# Patient Record
Sex: MALE | Race: BLACK OR AFRICAN AMERICAN
[De-identification: names, ages, dates, MRNs, and addresses within clinical notes are randomized per-mention and may not be internally consistent; named-entity substitution may affect disease eponyms.]

---

## 2020-11-02 ENCOUNTER — HOSPITAL ENCOUNTER (EMERGENCY)
Dept: HOSPITAL 63 - ER | Age: 25
Discharge: HOME | End: 2020-11-02
Payer: COMMERCIAL

## 2020-11-02 VITALS — WEIGHT: 178.57 LBS | BODY MASS INDEX: 25.56 KG/M2 | HEIGHT: 70 IN

## 2020-11-02 VITALS — SYSTOLIC BLOOD PRESSURE: 127 MMHG | DIASTOLIC BLOOD PRESSURE: 73 MMHG

## 2020-11-02 DIAGNOSIS — Z91.018: ICD-10-CM

## 2020-11-02 DIAGNOSIS — N13.2: Primary | ICD-10-CM

## 2020-11-02 DIAGNOSIS — R11.2: ICD-10-CM

## 2020-11-02 LAB
ALBUMIN SERPL-MCNC: 4.2 G/DL (ref 3.4–5)
ALP SERPL-CCNC: 84 U/L (ref 46–116)
ALT SERPL-CCNC: 37 U/L (ref 16–63)
AMPHETAMINE/METHAMPHETAMINE: (no result)
ANION GAP SERPL CALC-SCNC: 10 MMOL/L (ref 6–14)
APTT PPP: YELLOW S
AST SERPL-CCNC: 17 U/L (ref 15–37)
BACTERIA #/AREA URNS HPF: 0 /HPF
BARBITURATES UR-MCNC: (no result) UG/ML
BASOPHILS # BLD AUTO: 0 X10^3/UL (ref 0–0.2)
BASOPHILS NFR BLD: 0 % (ref 0–3)
BENZODIAZ UR-MCNC: (no result) UG/L
BILIRUB DIRECT SERPL-MCNC: 0.1 MG/DL (ref 0–0.2)
BILIRUB SERPL-MCNC: 0.2 MG/DL (ref 0.2–1)
BILIRUB UR QL STRIP: (no result)
CA-I SERPL ISE-MCNC: 17 MG/DL (ref 8–26)
CALCIUM SERPL-MCNC: 9.3 MG/DL (ref 8.5–10.1)
CANNABINOIDS UR-MCNC: (no result) UG/L
CHLORIDE SERPL-SCNC: 103 MMOL/L (ref 98–107)
CO2 SERPL-SCNC: 28 MMOL/L (ref 21–32)
COCAINE UR-MCNC: (no result) NG/ML
CREAT SERPL-MCNC: 1.3 MG/DL (ref 0.7–1.3)
EOSINOPHIL NFR BLD: 0.1 X10^3/UL (ref 0–0.7)
EOSINOPHIL NFR BLD: 1 % (ref 0–3)
ERYTHROCYTE [DISTWIDTH] IN BLOOD BY AUTOMATED COUNT: 13.4 % (ref 11.5–14.5)
FIBRINOGEN PPP-MCNC: CLEAR MG/DL
GFR SERPLBLD BASED ON 1.73 SQ M-ARVRAT: 80.7 ML/MIN
GLUCOSE SERPL-MCNC: 127 MG/DL (ref 70–99)
GLUCOSE UR STRIP-MCNC: (no result) MG/DL
HCT VFR BLD CALC: 43.8 % (ref 39–53)
HGB BLD-MCNC: 14.3 G/DL (ref 13–17.5)
LIPASE: 178 U/L (ref 73–393)
LYMPHOCYTES # BLD: 1.8 X10^3/UL (ref 1–4.8)
LYMPHOCYTES NFR BLD AUTO: 17 % (ref 24–48)
MCH RBC QN AUTO: 30 PG (ref 25–35)
MCHC RBC AUTO-ENTMCNC: 33 G/DL (ref 31–37)
MCV RBC AUTO: 90 FL (ref 79–100)
METHADONE SERPL-MCNC: (no result) NG/ML
MONO #: 0.6 X10^3/UL (ref 0–1.1)
MONOCYTES NFR BLD: 6 % (ref 0–9)
NEUT #: 7.9 X10^3UL (ref 1.8–7.7)
NEUTROPHILS NFR BLD AUTO: 76 % (ref 31–73)
NITRITE UR QL STRIP: (no result)
OPIATES UR-MCNC: (no result) NG/ML
PCP SERPL-MCNC: (no result) MG/DL
PLATELET # BLD AUTO: 160 X10^3/UL (ref 140–400)
POTASSIUM SERPL-SCNC: 3.3 MMOL/L (ref 3.5–5.1)
PROT SERPL-MCNC: 7.6 G/DL (ref 6.4–8.2)
RBC # BLD AUTO: 4.85 X10^6/UL (ref 4.3–5.7)
RBC #/AREA URNS HPF: (no result) /HPF (ref 0–2)
SODIUM SERPL-SCNC: 141 MMOL/L (ref 136–145)
SP GR UR STRIP: 1.02
SQUAMOUS #/AREA URNS LPF: (no result) /LPF
UROBILINOGEN UR-MCNC: 0.2 MG/DL
WBC # BLD AUTO: 10.4 X10^3/UL (ref 4–11)
WBC #/AREA URNS HPF: (no result) /HPF (ref 0–4)

## 2020-11-02 PROCEDURE — 80048 BASIC METABOLIC PNL TOTAL CA: CPT

## 2020-11-02 PROCEDURE — 85025 COMPLETE CBC W/AUTO DIFF WBC: CPT

## 2020-11-02 PROCEDURE — 83690 ASSAY OF LIPASE: CPT

## 2020-11-02 PROCEDURE — 85610 PROTHROMBIN TIME: CPT

## 2020-11-02 PROCEDURE — 36415 COLL VENOUS BLD VENIPUNCTURE: CPT

## 2020-11-02 PROCEDURE — 85730 THROMBOPLASTIN TIME PARTIAL: CPT

## 2020-11-02 PROCEDURE — 96361 HYDRATE IV INFUSION ADD-ON: CPT

## 2020-11-02 PROCEDURE — 82550 ASSAY OF CK (CPK): CPT

## 2020-11-02 PROCEDURE — 96374 THER/PROPH/DIAG INJ IV PUSH: CPT

## 2020-11-02 PROCEDURE — 96375 TX/PRO/DX INJ NEW DRUG ADDON: CPT

## 2020-11-02 PROCEDURE — 74176 CT ABD & PELVIS W/O CONTRAST: CPT

## 2020-11-02 PROCEDURE — 96372 THER/PROPH/DIAG INJ SC/IM: CPT

## 2020-11-02 PROCEDURE — 99285 EMERGENCY DEPT VISIT HI MDM: CPT

## 2020-11-02 PROCEDURE — 81001 URINALYSIS AUTO W/SCOPE: CPT

## 2020-11-02 PROCEDURE — 80307 DRUG TEST PRSMV CHEM ANLYZR: CPT

## 2020-11-02 PROCEDURE — 74022 RADEX COMPL AQT ABD SERIES: CPT

## 2020-11-02 PROCEDURE — 84484 ASSAY OF TROPONIN QUANT: CPT

## 2020-11-02 PROCEDURE — 80076 HEPATIC FUNCTION PANEL: CPT

## 2020-11-02 PROCEDURE — 93005 ELECTROCARDIOGRAM TRACING: CPT

## 2020-11-02 NOTE — PHYS DOC
General Adult


EDM:


Chief Complaint:  ABDOMINAL PAIN





HPI:


HPI:


"... I am dying... I ate some frozen chicken for dinner... Maybe it is bad..... 

I vomited up some brown stuff may be blood... I hurts so bad... Here in my left 

back and abdomen with pain runs clear down into my left nut... I cannot even 

stand still... This is come on all of a sudden.. I hurt so bad.. I am puking..."








Patient is a 25 year old male who presents with above hx and acute onset of 

vomiting, nausea, Lt flank pain that radiates to Lt testicle.   Pain started 

after eating some frozen chicken for dinner.  Pain is localized into left 

abdomen and flank.  Pain seems to radiate to left lower abdomen and testicle 

area.  Patient denies any past history of kidney stones or kidney stones with 

family members.  Patient denies any past history of colitis with him or family 

members.   Patient denies any trauma..   Patient denies any fever or chills.  

Patient denies any recent travel outside the [place] area.  Patient denies 

any ill contacts.  Patient denies any trauma.  Patient has been able to have 

normal stools and pass urine.  Patient has never had this symptom before.  

Patient states pain is so severe he cannot measure it.  Patient states the pain 

is so severe it caused him to vomit.   Patient has not had any tarry stools.  

Patient is unable to find a comfortable position and is constantly pacing the 

exam room 3.  Patient did state he smokes marijuana before the eating the frozen

chicken.   Patient denies other drug use.  Patient denies any history 

immunosuppression.  Patient denies any history of dysuria.





Review of Systems:


Review of Systems:


Constitutional:  Denies fever or chills 


Eyes:  Denies change in visual acuity 


HENT:  Denies nasal congestion or sore throat 


Respiratory:  Denies cough or shortness of breath 


Cardiovascular:  Denies chest pain or edema 


GI: Complains of acute onset left flank abdominal pain, nausea, vomiting.  

Denies, bloody stools or diarrhea 


: Denies dysuria 


Musculoskeletal: Complains of left flank pain


Integument:  Denies rash 


Neurologic:  Denies headache, focal weakness or sensory changes 


Endocrine:  Denies polyuria or polydipsia 


Lymphatic:  Denies swollen glands 


Psychiatric: Very anxious





Family History:


Family History:


Noncontributory to presentation





Current Medications:


Current Meds:


See nursing for home meds





Allergies:


Allergies:


Patient states he is allergic to all tree nuts





Physical Exam:


PE:





Constitutional: Well developed, well nourished, in acute distress, non-toxic 

appearance. []


HENT: Normocephalic, atraumatic, bilateral external ears normal, oropharynx 

moist, no oral exudates, nose normal. []


Eyes: PERRLA, EOMI, conjunctiva normal, no discharge. [] 


Neck: Normal range of motion, no tenderness, supple, no stridor. [] 


Cardiovascular: Tachycardia heart rate regular rhythm, no murmur []


Lungs & Thorax:  Bilateral breath sounds equal at apex on auscultation []


Abdomen: Bowel sounds decreased, soft, left flank pain and left lower abdomen 

pain, no masses, no pulsatile masses.  No penile discharge.  Testicles are non 

tender.  Rebound pain to left flank and left lower quadrant


Skin: Warm, diaphoretic, no erythema, no rash. [] 


Back: No tenderness, left CVA tenderness. [] 


Extremities: No tenderness, no cyanosis, no clubbing, ROM intact, no edema.  No 

psoas sign.


Neurologic: Alert and oriented X 3, normal motor function, normal sensory 

function, no focal deficits noted. []


Psychologic: Affect extremely anxious, agitated, unable to sit down paces the 

room, judgement normal, mood normal. []





EKG:


EKG:


My interpretation EKG shows a sinus rhythm at 69 bpm.  Does have some right axis

 changes however essentially a normal EKG.  No findings of acute morphology.  []





Radiology/Procedures:


Radiology/Procedures:


[][facility] [address], [place] [address]


                                 [phone]


                                        


                                 IMAGING REPORT





                                     Signed





PATIENT: ROWENA GAR ACCOUNT: [id number]     MRN#: [id number]


: 1995           LOCATION: ER              AGE: 25


SEX: M                    EXAM DT: 20         ACCESSION#: [id number]


STATUS: REG ER            ORD. PHYSICIAN: TISHA VERMA MD


REASON: Flank pain, vomiting, radiates to Lt. testicle


PROCEDURE: CT ABDOMEN PELVIS WO CONTRAST





PROCEDURE: CT ABDOMEN PELVIS WO CONTRAST, ACUTE ABDOMEN SERIES


 


STUDY DATE: 2020


 


CLINICAL INDICATION / HISTORY: Reason: Flank pain, vomiting, radiates to 


Lt. testicle / Spl. Instructions:  / History: .


 


TECHNIQUE: Upright PA chest, supine and upright films of the abdomen were 


obtained.


 


COMPARISON: None


 


FINDINGS: AP view the chest reveals the lungs to be clear.   Cardiac and 


mediastinal silhouette are unremarkable.  No free air is identified below 


the diaphragms.  Supine and decubitus views of the abdomen reveal no 


dilated loops of bowel or air-fluid levels.   No organomegaly is present. 


No destructive osseous lesions.


 


IMPRESSION: No radiographic evidence for bowel obstruction.


 


 


 


 


EXAM: CT Abdomen and Pelvis without IV contrast


 


INDICATION: Reason: Flank pain, vomiting, radiates to Lt. testicle / Spl. 


Instructions:  / History: 


 


TECHNIQUE: Multi-detector row CT images were acquired from the lung bases 


through the abdomen and pelvis without the use of IV contrast. Sagittal 


and coronal images were acquired from the transaxial data. All CT scans 


performed at this facility utilize dose optimization techniques as 


appropriate to the exam, including the following: Automated exposure 


control and adjustment of the mA and/or KV according to patient size (this


includes techniques or standardized protocols for targeted exams where 


dose is indication/reason for exam).


 


ORAL CONTRAST: None


 


COMPARISON: Obstructive series same day


 


FINDINGS: 


 


The absence of IV contrast limits evaluation of soft tissue pathology.


 


LOWER CHEST: Unremarkable


 


LIVER:  Unremarkable


 


BILIARY SYSTEM: Gallbladder is unremarkable. Bile ducts are not dilated.


 


PANCREAS:  Unremarkable


 


SPLEEN:  Unremarkable


 


ADRENALS:  Unremarkable


 


KIDNEYS & URETERS:  Left hydronephrosis and hydroureter is present in 


the setting of a 5 mm distal left ureteral stone (image 22 of series 2).


 


BLADDER:  Unremarkable


 


REPRODUCTIVE ORGANS:  Unremarkable


 


GASTROINTESTINAL: The stomach, small bowel, and colon are unremarkable. 


The appendix is normal.


 


MESENTERY/PERITONEUM/RETROPERITONEUM: Unremarkable


 


VASCULAR:  Unremarkable


 


LYMPH NODES:  No adenopathy


 


OSSEOUS & SOFT TISSUES:  Unremarkable


 


IMPRESSION:


 


Left ureteral obstruction from a 5 mm stone distally.


 


Electronically signed by: Reshma Javed MD (2020 7:43 PM) 


[facility] [place]














DICTATED AND SIGNED BY:     RESHMA JAVED MD


DATE:     20





CC: TISHA VERMA MD; PCP,NO ~





Heart Score:


HEART Score for Chest Pain:  








HEART Score for Chest Pain Response (Comments) Value


 


History Slighlty/Non-Suspicious 0


 


ECG Normal 0


 


Age < 45 0


 


Risk Factors No Risk Factors 0


 


Troponin < Normal Limit 0


 


Total  0








Risk Factors:


Risk Factors:  DM, Current or recent (<one month) smoker, HTN, HLP, family 

history of CAD, obesity.


Risk Scores:


Score 0 - 3:  2.5% MACE over next 6 weeks - Discharge Home


Score 4 - 6:  20.3% MACE over next 6 weeks - Admit for Clinical Observation


Score 7 - 10:  72.7% MACE over next 6 weeks - Early Invasive Strategies





Course & Med Decision Making:


Course & Med Decision Making


Pertinent Labs and Imaging studies reviewed. (See chart for details)





Patient to continue to push fluids.  Remain on a clear fluid diet.  No solids or

 milk products.  Patient told to expect another episode of severe pain.  Patient

 has a distal 5 mm stone with hydronephrosis.  Advised patient most likely he 

will be able to pass the stone since it is travel this far.. Advised patient he 

may take Zofran for active vomiting.  Patient take Tylenol and ibuprofen for 

pain.  For marked pain may take Vicoprofen.  Must continue to push fluids.  If 

stone is passed save it for further evaluation.  Advised patient if unable to to

lerate next episode of pain to present where they have urology consult such as 

[facility]  or with a urologist of his choice.  Patient to 

follow-up with urine for any signs of infection.  Patient discussed his recent 

episode of kidney stone with his primary care.  Patient advised he should never 

allow himself to become dehydrated because this will increase kidney stone 

formation.  








Impression:





1.  Abdomen pain-renal colic


2.  History of nausea and vomiting


3.  Hx tobacco marijuana use





[]





Dragon Disclaimer:


Dragon Disclaimer:


This electronic medical record was generated, in whole or in part, using a voice

 recognition dictation system.





Departure


Departure:


Referrals:  


PCP,NO (PCP)


Scripts


Ondansetron Hcl (ZOFRAN) 4 Mg Tablet


8 MG PO QIDPRN PRN for active n/v, #30 TAB


   Prov: TISHA VERMA MD         20 


Hydrocodone/Ibuprofen (HYDROCODONE-IBUPROFEN 7.5-200  **) 1 Each Tablet


1 TAB PO PRN Q6HRS PRN for PAIN, #30 TAB 0 Refills


   Prov: TISHA VERMA MD         20





Dragon Disclaimer


This chart was dictated in whole or in part using Voice Recognition software in 

a busy, high-work load, and often noisy Emergency Department environment.  It 

may contain unintended and wholly unrecognized errors or omissions.











TISHA VERMA MD            2020 18:23

## 2020-11-02 NOTE — RAD
PROCEDURE: CT ABDOMEN PELVIS WO CONTRAST, ACUTE ABDOMEN SERIES

 

STUDY DATE: 11/2/2020

 

CLINICAL INDICATION / HISTORY: Reason: Flank pain, vomiting, radiates to 

Lt. testicle / Spl. Instructions:  / History: .

 

TECHNIQUE: Upright PA chest, supine and upright films of the abdomen were 

obtained.

 

COMPARISON: None

 

FINDINGS: AP view the chest reveals the lungs to be clear.   Cardiac and 

mediastinal silhouette are unremarkable.  No free air is identified below 

the diaphragms.  Supine and decubitus views of the abdomen reveal no 

dilated loops of bowel or air-fluid levels.   No organomegaly is present. 

No destructive osseous lesions.

 

IMPRESSION: No radiographic evidence for bowel obstruction.

 

 

 

 

EXAM: CT Abdomen and Pelvis without IV contrast

 

INDICATION: Reason: Flank pain, vomiting, radiates to Lt. testicle / Spl. 

Instructions:  / History: 

 

TECHNIQUE: Multi-detector row CT images were acquired from the lung bases 

through the abdomen and pelvis without the use of IV contrast. Sagittal 

and coronal images were acquired from the transaxial data. All CT scans 

performed at this facility utilize dose optimization techniques as 

appropriate to the exam, including the following: Automated exposure 

control and adjustment of the mA and/or KV according to patient size (this

includes techniques or standardized protocols for targeted exams where 

dose is indication/reason for exam).

 

ORAL CONTRAST: None

 

COMPARISON: Obstructive series same day

 

FINDINGS: 

 

The absence of IV contrast limits evaluation of soft tissue pathology.

 

LOWER CHEST: Unremarkable

 

LIVER:  Unremarkable

 

BILIARY SYSTEM: Gallbladder is unremarkable. Bile ducts are not dilated.

 

PANCREAS:  Unremarkable

 

SPLEEN:  Unremarkable

 

ADRENALS:  Unremarkable

 

KIDNEYS & URETERS:  Left hydronephrosis and hydroureter is present in 

the setting of a 5 mm distal left ureteral stone (image 22 of series 2).

 

BLADDER:  Unremarkable

 

REPRODUCTIVE ORGANS:  Unremarkable

 

GASTROINTESTINAL: The stomach, small bowel, and colon are unremarkable. 

The appendix is normal.

 

MESENTERY/PERITONEUM/RETROPERITONEUM: Unremarkable

 

VASCULAR:  Unremarkable

 

LYMPH NODES:  No adenopathy

 

OSSEOUS & SOFT TISSUES:  Unremarkable

 

IMPRESSION:

 

Left ureteral obstruction from a 5 mm stone distally.

 

Electronically signed by: Nitesh Chang MD (11/2/2020 7:43 PM) 

[facility] [place]

## 2020-11-02 NOTE — RAD
PROCEDURE: CT ABDOMEN PELVIS WO CONTRAST, ACUTE ABDOMEN SERIES

 

STUDY DATE: 11/2/2020

 

CLINICAL INDICATION / HISTORY: Reason: Flank pain, vomiting, radiates to 

Lt. testicle / Spl. Instructions:  / History: .

 

TECHNIQUE: Upright PA chest, supine and upright films of the abdomen were 

obtained.

 

COMPARISON: None

 

FINDINGS: AP view the chest reveals the lungs to be clear.   Cardiac and 

mediastinal silhouette are unremarkable.  No free air is identified below 

the diaphragms.  Supine and decubitus views of the abdomen reveal no 

dilated loops of bowel or air-fluid levels.   No organomegaly is present. 

No destructive osseous lesions.

 

IMPRESSION: No radiographic evidence for bowel obstruction.

 

 

 

 

EXAM: CT Abdomen and Pelvis without IV contrast

 

INDICATION: Reason: Flank pain, vomiting, radiates to Lt. testicle / Spl. 

Instructions:  / History: 

 

TECHNIQUE: Multi-detector row CT images were acquired from the lung bases 

through the abdomen and pelvis without the use of IV contrast. Sagittal 

and coronal images were acquired from the transaxial data. All CT scans 

performed at this facility utilize dose optimization techniques as 

appropriate to the exam, including the following: Automated exposure 

control and adjustment of the mA and/or KV according to patient size (this

includes techniques or standardized protocols for targeted exams where 

dose is indication/reason for exam).

 

ORAL CONTRAST: None

 

COMPARISON: Obstructive series same day

 

FINDINGS: 

 

The absence of IV contrast limits evaluation of soft tissue pathology.

 

LOWER CHEST: Unremarkable

 

LIVER:  Unremarkable

 

BILIARY SYSTEM: Gallbladder is unremarkable. Bile ducts are not dilated.

 

PANCREAS:  Unremarkable

 

SPLEEN:  Unremarkable

 

ADRENALS:  Unremarkable

 

KIDNEYS & URETERS:  Left hydronephrosis and hydroureter is present in 

the setting of a 5 mm distal left ureteral stone (image 22 of series 2).

 

BLADDER:  Unremarkable

 

REPRODUCTIVE ORGANS:  Unremarkable

 

GASTROINTESTINAL: The stomach, small bowel, and colon are unremarkable. 

The appendix is normal.

 

MESENTERY/PERITONEUM/RETROPERITONEUM: Unremarkable

 

VASCULAR:  Unremarkable

 

LYMPH NODES:  No adenopathy

 

OSSEOUS & SOFT TISSUES:  Unremarkable

 

IMPRESSION:

 

Left ureteral obstruction from a 5 mm stone distally.

 

Electronically signed by: Nitesh Chang MD (11/2/2020 7:43 PM) 

[facility] [place]

## 2020-11-03 NOTE — EKG
[facility] [address], [place] [address]

Test Date:    2020               Test Time:    19:10:42

Pat Name:     ROWENA GAR           Department:   

Patient ID:   [id number]           Room:          

Gender:       M                        Technician:   

:          1995               Requested By: TISHA VERMA

Order Number: [id number]            Reading MD:   Rafael Andrade

                                 Measurements

Intervals                              Axis          

Rate:         69                       P:            68

GA:           152                      QRS:          92

QRSD:         98                       T:            44

QT:           360                                    

QTc:          387                                    

                           Interpretive Statements

SINUS RHYTHM

RIGHTWARD AXIS

Electronically Signed On 11-3-2020 10:36:26 CST by Rafael Andrade